# Patient Record
Sex: MALE | Employment: FULL TIME | URBAN - METROPOLITAN AREA
[De-identification: names, ages, dates, MRNs, and addresses within clinical notes are randomized per-mention and may not be internally consistent; named-entity substitution may affect disease eponyms.]

---

## 2019-02-08 ENCOUNTER — APPOINTMENT (OUTPATIENT)
Dept: GENERAL RADIOLOGY | Age: 49
End: 2019-02-08
Attending: EMERGENCY MEDICINE
Payer: COMMERCIAL

## 2019-02-08 ENCOUNTER — APPOINTMENT (OUTPATIENT)
Dept: CT IMAGING | Age: 49
End: 2019-02-08
Attending: EMERGENCY MEDICINE
Payer: COMMERCIAL

## 2019-02-08 ENCOUNTER — HOSPITAL ENCOUNTER (OUTPATIENT)
Age: 49
Setting detail: OBSERVATION
Discharge: HOME | End: 2019-02-10
Attending: EMERGENCY MEDICINE | Admitting: INTERNAL MEDICINE
Payer: COMMERCIAL

## 2019-02-08 DIAGNOSIS — H53.9 TRANSIENT VISION DISTURBANCE OF LEFT EYE: ICD-10-CM

## 2019-02-08 DIAGNOSIS — G43.109 COMPLICATED MIGRAINE: ICD-10-CM

## 2019-02-08 DIAGNOSIS — G45.9 TIA (TRANSIENT ISCHEMIC ATTACK): Primary | ICD-10-CM

## 2019-02-08 DIAGNOSIS — G45.3 AMAUROSIS FUGAX OF LEFT EYE: ICD-10-CM

## 2019-02-08 DIAGNOSIS — R51.9 NONINTRACTABLE HEADACHE, UNSPECIFIED CHRONICITY PATTERN, UNSPECIFIED HEADACHE TYPE: ICD-10-CM

## 2019-02-08 DIAGNOSIS — R47.9 SPEECH DISTURBANCE, UNSPECIFIED TYPE: ICD-10-CM

## 2019-02-08 LAB
ANION GAP SERPL CALC-SCNC: 7 MMOL/L (ref 5–15)
BASOPHILS # BLD: 0 K/UL (ref 0–0.1)
BASOPHILS NFR BLD: 0 % (ref 0–1)
BUN SERPL-MCNC: 12 MG/DL (ref 6–20)
BUN/CREAT SERPL: 11 (ref 12–20)
CALCIUM SERPL-MCNC: 9.2 MG/DL (ref 8.5–10.1)
CHLORIDE SERPL-SCNC: 104 MMOL/L (ref 97–108)
CO2 SERPL-SCNC: 30 MMOL/L (ref 21–32)
CREAT SERPL-MCNC: 1.06 MG/DL (ref 0.7–1.3)
DIFFERENTIAL METHOD BLD: NORMAL
EOSINOPHIL # BLD: 0.2 K/UL (ref 0–0.4)
EOSINOPHIL NFR BLD: 2 % (ref 0–7)
ERYTHROCYTE [DISTWIDTH] IN BLOOD BY AUTOMATED COUNT: 12.4 % (ref 11.5–14.5)
GLUCOSE BLD STRIP.AUTO-MCNC: 89 MG/DL (ref 65–100)
GLUCOSE SERPL-MCNC: 83 MG/DL (ref 65–100)
HCT VFR BLD AUTO: 46.2 % (ref 36.6–50.3)
HGB BLD-MCNC: 16.4 G/DL (ref 12.1–17)
IMM GRANULOCYTES # BLD AUTO: 0 K/UL (ref 0–0.04)
IMM GRANULOCYTES NFR BLD AUTO: 0 % (ref 0–0.5)
INR PPP: 1 (ref 0.9–1.1)
LYMPHOCYTES # BLD: 2.6 K/UL (ref 0.8–3.5)
LYMPHOCYTES NFR BLD: 31 % (ref 12–49)
MCH RBC QN AUTO: 32.7 PG (ref 26–34)
MCHC RBC AUTO-ENTMCNC: 35.5 G/DL (ref 30–36.5)
MCV RBC AUTO: 92.2 FL (ref 80–99)
MONOCYTES # BLD: 0.8 K/UL (ref 0–1)
MONOCYTES NFR BLD: 10 % (ref 5–13)
NEUTS SEG # BLD: 4.7 K/UL (ref 1.8–8)
NEUTS SEG NFR BLD: 56 % (ref 32–75)
NRBC # BLD: 0 K/UL (ref 0–0.01)
NRBC BLD-RTO: 0 PER 100 WBC
PLATELET # BLD AUTO: 226 K/UL (ref 150–400)
PMV BLD AUTO: 10.1 FL (ref 8.9–12.9)
POTASSIUM SERPL-SCNC: 3.5 MMOL/L (ref 3.5–5.1)
PROTHROMBIN TIME: 9.9 SEC (ref 9–11.1)
RBC # BLD AUTO: 5.01 M/UL (ref 4.1–5.7)
SERVICE CMNT-IMP: NORMAL
SODIUM SERPL-SCNC: 141 MMOL/L (ref 136–145)
WBC # BLD AUTO: 8.3 K/UL (ref 4.1–11.1)

## 2019-02-08 PROCEDURE — 71045 X-RAY EXAM CHEST 1 VIEW: CPT

## 2019-02-08 PROCEDURE — 74011250636 HC RX REV CODE- 250/636: Performed by: INTERNAL MEDICINE

## 2019-02-08 PROCEDURE — 93005 ELECTROCARDIOGRAM TRACING: CPT

## 2019-02-08 PROCEDURE — 70450 CT HEAD/BRAIN W/O DYE: CPT

## 2019-02-08 PROCEDURE — 99285 EMERGENCY DEPT VISIT HI MDM: CPT

## 2019-02-08 PROCEDURE — 85610 PROTHROMBIN TIME: CPT

## 2019-02-08 PROCEDURE — 80048 BASIC METABOLIC PNL TOTAL CA: CPT

## 2019-02-08 PROCEDURE — 74011250637 HC RX REV CODE- 250/637: Performed by: EMERGENCY MEDICINE

## 2019-02-08 PROCEDURE — 99218 HC RM OBSERVATION: CPT

## 2019-02-08 PROCEDURE — 82962 GLUCOSE BLOOD TEST: CPT

## 2019-02-08 PROCEDURE — 94762 N-INVAS EAR/PLS OXIMTRY CONT: CPT

## 2019-02-08 PROCEDURE — 85025 COMPLETE CBC W/AUTO DIFF WBC: CPT

## 2019-02-08 PROCEDURE — 36415 COLL VENOUS BLD VENIPUNCTURE: CPT

## 2019-02-08 RX ORDER — SODIUM CHLORIDE, SODIUM LACTATE, POTASSIUM CHLORIDE, CALCIUM CHLORIDE 600; 310; 30; 20 MG/100ML; MG/100ML; MG/100ML; MG/100ML
75 INJECTION, SOLUTION INTRAVENOUS CONTINUOUS
Status: DISCONTINUED | OUTPATIENT
Start: 2019-02-09 | End: 2019-02-09

## 2019-02-08 RX ORDER — ACETAMINOPHEN 325 MG/1
650 TABLET ORAL
Status: DISCONTINUED | OUTPATIENT
Start: 2019-02-08 | End: 2019-02-10 | Stop reason: HOSPADM

## 2019-02-08 RX ORDER — ASPIRIN 325 MG
325 TABLET ORAL
Status: COMPLETED | OUTPATIENT
Start: 2019-02-08 | End: 2019-02-08

## 2019-02-08 RX ADMIN — SODIUM CHLORIDE, SODIUM LACTATE, POTASSIUM CHLORIDE, AND CALCIUM CHLORIDE 75 ML/HR: 600; 310; 30; 20 INJECTION, SOLUTION INTRAVENOUS at 23:36

## 2019-02-08 RX ADMIN — ASPIRIN 325 MG: 325 TABLET ORAL at 22:42

## 2019-02-09 ENCOUNTER — APPOINTMENT (OUTPATIENT)
Dept: NON INVASIVE DIAGNOSTICS | Age: 49
End: 2019-02-09
Attending: FAMILY MEDICINE
Payer: COMMERCIAL

## 2019-02-09 ENCOUNTER — APPOINTMENT (OUTPATIENT)
Dept: VASCULAR SURGERY | Age: 49
End: 2019-02-09
Attending: FAMILY MEDICINE
Payer: COMMERCIAL

## 2019-02-09 ENCOUNTER — APPOINTMENT (OUTPATIENT)
Dept: MRI IMAGING | Age: 49
End: 2019-02-09
Attending: FAMILY MEDICINE
Payer: COMMERCIAL

## 2019-02-09 LAB
ATRIAL RATE: 65 BPM
CALCULATED P AXIS, ECG09: 54 DEGREES
CALCULATED R AXIS, ECG10: 50 DEGREES
CALCULATED T AXIS, ECG11: 48 DEGREES
DIAGNOSIS, 93000: NORMAL
ECHO AO ROOT DIAM: 3.32 CM
ECHO AV PEAK GRADIENT: 5.3 MMHG
ECHO AV PEAK VELOCITY: 114.91 CM/S
ECHO LA MAJOR AXIS: 3.48 CM
ECHO LA TO AORTIC ROOT RATIO: 1.05
ECHO LV E' LATERAL VELOCITY: 10.67 CM/S
ECHO LV E' SEPTAL VELOCITY: 9.01 CM/S
ECHO LV INTERNAL DIMENSION DIASTOLIC: 4.52 CM (ref 4.2–5.9)
ECHO LV INTERNAL DIMENSION SYSTOLIC: 3.3 CM
ECHO LV IVSD: 0.75 CM (ref 0.6–1)
ECHO LV MASS 2D: 111.2 G (ref 88–224)
ECHO LV MASS INDEX 2D: 56.5 G/M2 (ref 49–115)
ECHO LV POSTERIOR WALL DIASTOLIC: 0.7 CM (ref 0.6–1)
ECHO MV A VELOCITY: 60.25 CM/S
ECHO MV AREA PHT: 2.7 CM2
ECHO MV E DECELERATION TIME (DT): 285.2 MS
ECHO MV E VELOCITY: 0.64 CM/S
ECHO MV E/A RATIO: 0.01
ECHO MV E/E' LATERAL: 0.06
ECHO MV E/E' RATIO (AVERAGED): 0.07
ECHO MV E/E' SEPTAL: 0.07
ECHO MV PRESSURE HALF TIME (PHT): 82.7 MS
ECHO PV MAX VELOCITY: 72.7 CM/S
ECHO PV PEAK GRADIENT: 2.1 MMHG
ECHO RV INTERNAL DIMENSION: 3.86 CM
ECHO RV TAPSE: 1.88 CM (ref 1.5–2)
ECHO TV REGURGITANT MAX VELOCITY: 190.79 CM/S
ECHO TV REGURGITANT PEAK GRADIENT: 14.6 MMHG
P-R INTERVAL, ECG05: 112 MS
Q-T INTERVAL, ECG07: 386 MS
QRS DURATION, ECG06: 90 MS
QTC CALCULATION (BEZET), ECG08: 401 MS
VENTRICULAR RATE, ECG03: 65 BPM

## 2019-02-09 PROCEDURE — 96360 HYDRATION IV INFUSION INIT: CPT

## 2019-02-09 PROCEDURE — 93308 TTE F-UP OR LMTD: CPT

## 2019-02-09 PROCEDURE — 99218 HC RM OBSERVATION: CPT

## 2019-02-09 PROCEDURE — 74011250637 HC RX REV CODE- 250/637: Performed by: INTERNAL MEDICINE

## 2019-02-09 PROCEDURE — 94760 N-INVAS EAR/PLS OXIMETRY 1: CPT

## 2019-02-09 PROCEDURE — 70553 MRI BRAIN STEM W/O & W/DYE: CPT

## 2019-02-09 PROCEDURE — 97116 GAIT TRAINING THERAPY: CPT

## 2019-02-09 PROCEDURE — 74011250636 HC RX REV CODE- 250/636: Performed by: HOSPITALIST

## 2019-02-09 PROCEDURE — 93880 EXTRACRANIAL BILAT STUDY: CPT

## 2019-02-09 PROCEDURE — 93306 TTE W/DOPPLER COMPLETE: CPT

## 2019-02-09 PROCEDURE — 74011636320 HC RX REV CODE- 636/320: Performed by: RADIOLOGY

## 2019-02-09 PROCEDURE — 97162 PT EVAL MOD COMPLEX 30 MIN: CPT

## 2019-02-09 PROCEDURE — 74011000258 HC RX REV CODE- 258: Performed by: RADIOLOGY

## 2019-02-09 PROCEDURE — 74011250636 HC RX REV CODE- 250/636: Performed by: FAMILY MEDICINE

## 2019-02-09 PROCEDURE — 96361 HYDRATE IV INFUSION ADD-ON: CPT

## 2019-02-09 PROCEDURE — 74011250637 HC RX REV CODE- 250/637: Performed by: FAMILY MEDICINE

## 2019-02-09 PROCEDURE — A9575 INJ GADOTERATE MEGLUMI 0.1ML: HCPCS | Performed by: HOSPITALIST

## 2019-02-09 RX ORDER — SODIUM CHLORIDE 0.9 % (FLUSH) 0.9 %
10 SYRINGE (ML) INJECTION
Status: COMPLETED | OUTPATIENT
Start: 2019-02-09 | End: 2019-02-09

## 2019-02-09 RX ORDER — SODIUM CHLORIDE 0.9 % (FLUSH) 0.9 %
5-40 SYRINGE (ML) INJECTION EVERY 8 HOURS
Status: DISCONTINUED | OUTPATIENT
Start: 2019-02-09 | End: 2019-02-10 | Stop reason: HOSPADM

## 2019-02-09 RX ORDER — GUAIFENESIN 100 MG/5ML
81 LIQUID (ML) ORAL DAILY
Status: DISCONTINUED | OUTPATIENT
Start: 2019-02-09 | End: 2019-02-10 | Stop reason: HOSPADM

## 2019-02-09 RX ORDER — SODIUM CHLORIDE 9 MG/ML
100 INJECTION, SOLUTION INTRAVENOUS CONTINUOUS
Status: DISCONTINUED | OUTPATIENT
Start: 2019-02-09 | End: 2019-02-09

## 2019-02-09 RX ORDER — GADOTERATE MEGLUMINE 376.9 MG/ML
15 INJECTION INTRAVENOUS
Status: COMPLETED | OUTPATIENT
Start: 2019-02-09 | End: 2019-02-09

## 2019-02-09 RX ORDER — SODIUM CHLORIDE 0.9 % (FLUSH) 0.9 %
5-40 SYRINGE (ML) INJECTION AS NEEDED
Status: DISCONTINUED | OUTPATIENT
Start: 2019-02-09 | End: 2019-02-10 | Stop reason: HOSPADM

## 2019-02-09 RX ADMIN — IOPAMIDOL 100 ML: 755 INJECTION, SOLUTION INTRAVENOUS at 18:54

## 2019-02-09 RX ADMIN — Medication 10 ML: at 16:28

## 2019-02-09 RX ADMIN — SODIUM CHLORIDE 100 ML: 900 INJECTION, SOLUTION INTRAVENOUS at 18:54

## 2019-02-09 RX ADMIN — SODIUM CHLORIDE 100 ML/HR: 900 INJECTION, SOLUTION INTRAVENOUS at 03:29

## 2019-02-09 RX ADMIN — Medication 10 ML: at 15:00

## 2019-02-09 RX ADMIN — Medication 10 ML: at 18:54

## 2019-02-09 RX ADMIN — Medication 10 ML: at 21:13

## 2019-02-09 RX ADMIN — ASPIRIN 81 MG CHEWABLE TABLET 81 MG: 81 TABLET CHEWABLE at 08:53

## 2019-02-09 RX ADMIN — GADOTERATE MEGLUMINE 15 ML: 376.9 INJECTION INTRAVENOUS at 16:28

## 2019-02-09 RX ADMIN — Medication 10 ML: at 05:25

## 2019-02-09 RX ADMIN — ACETAMINOPHEN 650 MG: 325 TABLET ORAL at 19:14

## 2019-02-09 NOTE — PROGRESS NOTES
Bedside shift change report given to Tomas Knox (oncoming nurse) by Fany Kemp (offgoing nurse). Report included the following information SBAR, Kardex, Procedure Summary, MAR, Accordion, Recent Results and Cardiac Rhythm NSR.

## 2019-02-09 NOTE — PROGRESS NOTES
Problem: Pressure Injury - Risk of 
Goal: *Prevention of pressure injury Document Alfonso Scale and appropriate interventions in the flowsheet. Outcome: Progressing Towards Goal 
Pressure Injury Interventions: 
  
 
  
 
  
 
  
 
  
 
  
 
  
 
 
 
 
Problem: Falls - Risk of 
Goal: *Absence of Falls Document Ann Primes Fall Risk and appropriate interventions in the flowsheet. Outcome: Progressing Towards Goal 
Fall Risk Interventions:

## 2019-02-09 NOTE — PROGRESS NOTES
Mr. Jhonathan Kirk was seen along with his wife and father. An assessment was completed. He lives with his wife in a 2 story home with 6 step to enter. He had no medical equipment in his home. He has not been in an acute rehab nor skilled facility. He has not had any home health in the past.  He does have a primary care physician who he sees on a regular basis. There were no discharge needs identified at this time. Will continue to follow for discharge planning. Signed By: Ronny Jacinto LCSW February 9, 2019

## 2019-02-09 NOTE — CONSULTS
Consult dictated. 43-year-old male with no vascular risk factors admitted with left peripheral visual field deficit followed by mild left temporal headache and transient difficulty with speech. Now back to normal.  I suspect that this was likely a migraine but cannot completely exclude TIA. Recommend full workup including MRI brain, lipid panel, echocardiogram and CT of the head and neck to evaluate posterior circulation. If all negative, DC home. Reasonable to continue baby aspirin.    Marko Grande MD

## 2019-02-09 NOTE — PROGRESS NOTES
TRANSFER - IN REPORT: 
 
Verbal report received from [facility] on [place]  being received from [facility](unit) for routine progression of care Report consisted of patients Situation, Background, Assessment and  
Recommendations(SBAR). Information from the following report(s) SBAR, ED Summary, Procedure Summary, MAR, Accordion and Recent Results was reviewed with the receiving nurse. Opportunity for questions and clarification was provided. Assessment completed upon patients arrival to unit and care assumed.

## 2019-02-09 NOTE — ED NOTES
Patient pain level assessed. Patient states having a headache. Rates pain 3/10. Patient was offered Tylenol for pain. Patient refused at this time.

## 2019-02-09 NOTE — PROGRESS NOTES
physical Therapy EVALUATION/DISCHARGE Patient: Nathan Uribe (68 y.o. male) Date: 2/9/2019 Primary Diagnosis: TIA (transient ischemic attack) [G45.9] Precautions:     
ASSESSMENT : 
Based on the objective data described below, the patient presents with transient symptoms of loss of left eye vision, which preceded his hospitalization. During this incident, he also had brief loss of speech. Prior to this incident, he was fully independent without AD's and works as a mailman. Today his wife was also present for the evaluation. His wife notes that they have had several family emergencies and stressful events in the past few months, one of which was spending the night in the hospital with his father-in-law earlier this week. Today his vision is fully clear as is his speech. He denies headache, blurred vision, dizziness or light-headedness. He has fully UE and LE AROM and strength. He demonstrates a normal gait without LOB at any point. He has normal balance. He is clear from a PT standpoint. Further skilled acute physical therapy is not indicated at this time. PLAN : 
Discharge Recommendations: None Further Equipment Recommendations for Discharge: N/A  
 
SUBJECTIVE:  
Patient stated I feel fine today.  OBJECTIVE DATA SUMMARY:  
HISTORY:   
History reviewed. No pertinent past medical history. History reviewed. No pertinent surgical history. Prior Level of Function/Home Situation: Independent with all ADL's and works as a mailman. Personal factors and/or comorbidities impacting plan of care:  
 
Home Situation Home Environment: Private residence # Steps to Enter: 3 One/Two Story Residence: Two story Living Alone: No 
Support Systems: Family member(s), Spouse/Significant Other/Partner Patient Expects to be Discharged to[de-identified] Private residence Current DME Used/Available at Home: None EXAMINATION/PRESENTATION/DECISION MAKING:  
Critical Behavior: 
Neurologic State: Alert, Appropriate for age Orientation Level: Oriented X4 Cognition: Appropriate decision making, Appropriate for age attention/concentration, Appropriate safety awareness Safety/Judgement: Good awareness of safety precautions Hearing: Auditory Auditory Impairment: None Skin:   
Edema:  
Range Of Motion: 
AROM: Within functional limits PROM: Within functional limits Strength:   
Strength: Within functional limits Tone & Sensation:  
Tone: Normal 
Sensation: Intact Coordination: 
Coordination: Within functional limits Vision:  
  
Functional Mobility: 
Bed Mobility: 
Rolling: Independent Supine to Sit: Independent Sit to Supine: Independent Scooting: Independent Transfers: 
Sit to Stand: Independent Stand to Sit: Independent Balance:  
Sitting: Intact Standing: Intact Ambulation/Gait Training: 
  
Gait Description (WDL): Within defined limits Functional Measure: 
Timed up and go: 
 
Timed Get Up And Go Test: 7  
 
 
< than 10 seconds=Normal  Greater then 13.5 seconds (in elderly)=Increased fall risk Jerone Litten M. Predicting the probability for falls in community dwelling older adults using the Timed Up and Go Test. Phys Ther. 2000;80:896-903. Physical Therapy Evaluation Charge Determination History Examination Presentation Decision-Making LOW Complexity : Zero comorbidities / personal factors that will impact the outcome / POC LOW Complexity : 1-2 Standardized tests and measures addressing body structure, function, activity limitation and / or participation in recreation  LOW Complexity : Stable, uncomplicated  LOW Complexity : FOTO score of  Based on the above components, the patient evaluation is determined to be of the following complexity level: LOW Pain: 
Pain Scale 1: Numeric (0 - 10) Pain Intensity 1: 0 Pain Location 1: Head Activity Tolerance:  
Good Please refer to the flowsheet for vital signs taken during this treatment. After treatment:  
[]   Patient left in no apparent distress sitting up in chair 
[]   Patient left in no apparent distress in bed 
[]   Call bell left within reach 
[]   Nursing notified 
[]   Caregiver present 
[]   Bed alarm activated COMMUNICATION/EDUCATION:  
Communication/Collaboration: 
[x]   Fall prevention education was provided and the patient/caregiver indicated understanding. [x]   Patient/family have participated as able and agree with findings and recommendations. []   Patient is unable to participate in plan of care at this time. Findings and recommendations were discussed with: Registered Nurse Thank you for this referral. 
Micheal Recio, PT Time Calculation: 25 mins

## 2019-02-09 NOTE — PROGRESS NOTES
Hospitalist Progress Note Mari Nelson NP Answering service: [phone] OR [phone] from in house phone Cell: [phone] Date of Service:  2019 NAME:  Enrique Nguyen :  1970 MRN:  [id number] Admission Summary: Pt presented as direct admission/transfer from [facility] to [address] [place] [address] with chief complaints of vision loss and trouble finding words. According to 
collective reports, the patient had onset of vision loss in his left eye which was transient, lasting for approximately 20-30 minutes and spontaneously resolving (described as standing ou in the sun and then looking at someone). He subsequently went to an event where he had trouble finding his words and noted aphasia lasting for a \"short period of time,\" which  spontaneously resolved. According to ER report, patient had a left temporal headache, which again has resolved. Pmhx: lumbar diskectomy Interval history / Subjective:  
Pt in bed, wife at bedside. No new complaints or reoccurance of symptoms. Discussed plan of care, awaiting imaging. Assessment & Plan:  
 
Possible TIA or possible migraine: -  MRI of the brain: Essentially normal MRI of the head. - bilateral carotid Doppler: Prelim: Consistent with no stenosis of the right internal carotid and minimal to no stenosis of the left internal carotid. - Echo: Trace mitral valve regurgitation, no PFO 
- Neurologist has seen, possible migraine but cannot exclude TIA, needs to complete stroke work up 
- HgbA1c and lipid panel pending 
- started asa on admit Left eye vision loss: Resolved, as above Expressive aphasia: Resolved Headache: Resolved. Code status: Full DVT prophylaxis: SCDs Care Plan discussed with: patient/family Disposition: home soon, likely tomorrow Hospital Problems  Date Reviewed: 2019 Codes Class Noted POA * (Principal) TIA (transient ischemic attack) ICD-10-CM: G45.9 ICD-9-CM: 435.9  2/8/2019 Unknown Review of Systems:  
Denies HA. No aphasia. No chest, pressure or palpitations. No respiratory or GI complaints. Vital Signs:  
 Last 24hrs VS reviewed since prior progress note. Most recent are: 
Visit Vitals /66 (BP 1 Location: Left arm, BP Patient Position: At rest) Pulse 66 Temp 97.6 °F (36.4 °C) Resp 18 Ht 6' (1.829 m) Wt 75.3 kg (166 lb) SpO2 98% BMI 22.51 kg/m² No intake or output data in the 24 hours ending 02/09/19 1743 Physical Examination:  
     
Constitutional:  No acute distress, cooperative, pleasant   
ENT:  Oral MM moist, oropharynx benign. Resp:  No accessory muscle use, on RA. CV:  Regular rhythm, normal rate, no murmurs. NSR on tele Musculoskeletal:  No edema, warm, 2+ pulses throughout Neurologic:  Moves all extremities. AAOx3, CN II-XII reviewed. No noted motor deficits. Sensation is intact. Speech clear. Data Review:  
Review and/or order of clinical lab test 
Review and/or order of tests in the radiology section of CPT Review and/or order of tests in the medicine section of CPT Labs:  
 
Recent Labs 02/08/19 
2213 WBC 8.3 HGB 16.4 HCT 46.2  Recent Labs 02/08/19 
2213   
K 3.5  CO2 30 BUN 12  
CREA 1.06  
GLU 83  
CA 9.2 No results for input(s): SGOT, GPT, ALT, AP, TBIL, TBILI, TP, ALB, GLOB, GGT, AML, LPSE in the last 72 hours. No lab exists for component: AMYP, HLPSE Recent Labs 02/08/19 2213 INR 1.0 PTP 9.9 No results for input(s): FE, TIBC, PSAT, FERR in the last 72 hours. No results found for: FOL, RBCF No results for input(s): PH, PCO2, PO2 in the last 72 hours. No results for input(s): CPK, CKNDX, TROIQ in the last 72 hours. No lab exists for component: CPKMB No results found for: CHOL, CHOLX, CHLST, CHOLV, HDL, LDL, LDLC, DLDLP, Luci Faith, [address] [place] [address] Rd, [address] [facility], [address]  [facility] Lab Results Component Value Date/Time Glucose (POC) 89 02/08/2019 10:02 PM  
 
No results found for: COLOR, APPRN, SPGRU, REFSG, JARETH, PROTU, GLUCU, KETU, BILU, UROU, EUN, LEUKU, GLUKE, EPSU, BACTU, WBCU, RBCU, CASTS, UCRY Medications Reviewed:  
 
Current Facility-Administered Medications Medication Dose Route Frequency  influenza vaccine 2018-19 (6 mos+)(PF) (FLUARIX QUAD/FLULAVAL QUAD) injection 0.5 mL  0.5 mL IntraMUSCular PRIOR TO DISCHARGE  sodium chloride (NS) flush 5-40 mL  5-40 mL IntraVENous Q8H  
 sodium chloride (NS) flush 5-40 mL  5-40 mL IntraVENous PRN  
 aspirin chewable tablet 81 mg  81 mg Oral DAILY  sodium chloride 0.9 % bolus infusion 100 mL  100 mL IntraVENous RAD ONCE  
 iopamidol (ISOVUE-370) 76 % injection 100 mL  100 mL IntraVENous RAD ONCE  
 sodium chloride (NS) flush 10 mL  10 mL IntraVENous RAD ONCE  
 acetaminophen (TYLENOL) tablet 650 mg  650 mg Oral Q4H PRN  
______________________________________________________________________ EXPECTED LENGTH OF STAY: - - - 
ACTUAL LENGTH OF STAY:          0 Angelika Belcher NP

## 2019-02-09 NOTE — H&P
[address] HISTORY AND PHYSICAL Name:  Kemi Junior 
MR#:  [id number] :  1970 ACCOUNT #:  [de-identified] ADMIT DATE:  2019 CHIEF COMPLAINT:  Vision loss, trouble finding words, and headache. HISTORY OF PRESENT ILLNESS:  A 78-year-old white male with past medical history of 
prior  lumbar diskectomy who presented as direct admission/transfer from [facility] to [facility] with chief complaints of 
vision loss and trouble finding words. The patient provided some history. Major 
history was obtained from review of ED and electronic medical records. According to 
the collective report, the patient had onset of vision loss in his left eye which was 
transient, lasting for approximately 20-30 minutes and spontaneously resolving with 
onset at 4 o'clock p.m. on 2019. The patient subsequently went to an event 
where he had trouble finding his words with noted aphasia lasting for a \"short period 
of time,\" which again spontaneously resolved. According to ER report, the patient 
had a left temporal headache, which again has resolved. The patient has described 
his vision loss as \"like standing out in the [employer] and then looking at someone. \" He is 
not having any residual neurological symptoms at this time. He denies having any 
facial droop. No reports of slurred speech, dizziness, lightheadedness, syncope, 
loss of consciousness, ataxia, falls, head or neck trauma, earaches, weakness, 
numbness, chest pain, shortness of breath, cough, congestion, abdominal pain, nausea, 
vomiting, diarrhea, melena, dysuria, hematuria, calf pain, swelling, edema, fever, 
chills, rash, neck pain, or other constitutional symptoms. PAST MEDICAL HISTORY:  Back pain. PAST SURGICAL HISTORY: 
1.  Lumbar diskectomy. 2.  Tonsillectomy. ALLERGIES:  NO KNOWN DRUG ALLERGIES. CURRENT MEDICATIONS:  None. SOCIAL HISTORY:  Denies smoking, alcohol, or drugs. He is . Wife is at the 
bedside. FAMILY HISTORY:  Negative for stroke. REVIEW OF SYSTEMS:  Pertinent positives or as noted in the HPI. All other systems 
were reviewed and negative. PHYSICAL EXAMINATION: 
VITAL SIGNS:  Temperature of 98.1 degrees Fahrenheit, blood pressure 111/60, heart 
rate of 70, respirations 14, O2 saturations 97% on room air. Recorded weight 166 
pounds (75.6 kg). GENERAL:  The patient in no acute respiratory distress. PSYCH:   The patient is awake, alert, and oriented x3. Flat affect. NEUROLOGIC:  GCS of 15. Moves extremities x4. Sensation is grossly intact without 
slurred speech, facial droop, or pronator drift. HEENT:  Normocephalic, atraumatic. PERRLA. EOMs intact. Sclerae are anicteric. Conjunctivae are clear. Nares are patent. Oropharynx is clear. Tongue is midline 
and nonedematous. NECK:  Supple without lymphadenopathy, JVD, or carotid bruits. No thyromegaly. Nontender. No acute palpable soft tissue or bony deformity. LYMPH:  Negative for cervical or supraclavicular adenopathy. RESPIRATORY:  Lungs clear to auscultation bilaterally. CVS:  Heart regular rate and rhythm. Normal S1 and S2. 
GI:  No rebound, guarding, or rigidity. Abdomen soft, nontender, nondistended. Normoactive bowel sounds. No auscultated abdominal bruits. No palpable abdominal 
mass. BACK:  No CVA tenderness. No step-off deformity. MUSCULOSKELETAL:  No calf tenderness. No acute palpable bony deformity. VASCULAR:  2+ radial, 2 DP pulses. No cyanosis, clubbing, or edema. SKIN:  Warm and dry. LABORATORY DATA:  Labs are reviewed as follows:  Sodium 141, potassium 3.5, chloride 104, CO2 30, BUN 12, creatinine 1.06, glucose of 83, anion gap 7, calcium 9.2, GFR 
greater than 60. WBC 8.3, hemoglobin 16.4, hematocrit 46.2, platelets 271, 
neutrophils 56%. INR 1.0, PT 9.9. CT Code Neuro head without contrast result showed no acute process, unremarkable head CT with no acute infarct. No intracranial hemorrhage, mass effect, or midline shift. Chest x-ray portable, no acute cardiopulmonary process. A 12-lead EKG, normal sinus rhythm at 65 beats per minute. IMPRESSION AND PLAN: 
1. Transient ischemic attack. Admit the patient for observation to the 
Neuro/Telemetry floor. Place on neurovascular checks, fall precautions. We will 
order an MRI of the brain with and without contrast.  We will order bilateral carotid Doppler and a 2D echocardiogram in the a.m. Consult with Neurologist.  Check lipid 
panel. We will order aspirin therapy. 2.  Left eye vision loss. Currently resolved. Plan as noted above. 3.  Expressive aphasia. Currently resolved. Continue with plan of care. We will 
order dysphasia screen. 4.  Headache. Currently resolved. 5.  Venous thromboembolism prophylaxis. Sequential compression devices to bilateral 
lower extremities. Arianne Mchgee MD [id number] 
D:  02/09/2019 3:42 T:  02/10/2019 11:37 
JOB #:  [id number]

## 2019-02-09 NOTE — CONSULTS
[address]    Name:  Harshad Perez  MR#:  [id number]  :  1970  ACCOUNT #:  [de-identified]  DATE OF SERVICE:  2018    REQUESTING PHYSICIAN:  Dr. Carl Harvey.    REASON FOR EVALUATION:  Stroke-like symptoms. HISTORY:  The patient is a 78-year-old male with no significant past medical history  who was in his usual baseline state of health and was at work. 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 He suddenly noticed that he was having  visual obscurations in the left peripheral visual field. He could see things but  could not clearly make out what they were. There were patches of blurriness. He  describes this as if he were looking at the son, and then tried to look at something  else. This continued for about 30 minutes or so. He was able to drive himself back  home. He noticed a mild headache in the left temporal region, which persisted for  some time and cleared up. Later, they went to eat at a fast food restaurant and then  went to watch a play. While he was there, he had some difficulty expressing himself,  but it lasted only for a few sentences. He called his eye doctor who advised that he  seek medical attention. He was admitted for further workup. He is completely back  to his baseline. He may have had mild headache here and there, but denies any  history of migraines. No similar symptoms or any other neurological events in the  past.  Denies any changes in strength, sensation, or balance. No difficulty with  chewing or swallowing. No focal motor sensory deficits. PAST MEDICAL HISTORY  As mentioned above. PAST SURGICAL HISTORY:  Lumbar diskectomy and tonsillectomy. ALLERGIES:  NONE. CURRENT MEDICATIONS:  None. FAMILY HISTORY:  Noncontributory. REVIEW OF SYSTEMS:  As mentioned above. PHYSICAL EXAMINATION:GENERAL:  On examination, the patient is alert, fully oriented.    His speech is clear, comprehension is normal.  VITAL SIGNS:  Blood pressure 104/66, temperature is 98, pulse of 67. HEENT:  Pupils equal, round, and reactive. Fundus reveals clear disc margins. Visual fields are intact. Hearing is baseline. The patient's sensation is intact. Tongue is midline. HEART:  Regular rate and rhythm. CHEST:  Clear. ABDOMEN:  Soft, nontender, positive bowel sounds. EXTREMITIES:  No edema. NEUROLOGIC:  Muscle tone and bulk normal.  Strength normal in both upper and lower  extremities. Deep tendon reflexes 2/2 and symmetric. Toes downgoing. Sensation  intact. Gait normal.    LABORATORY DATA:  CBC and chemistries are unremarkable. CT brain was negative. Carotid duplex was negative. Echocardiogram is pending. ASSESSMENT AND PLAN:  A 49-year-old male with no significant vascular risk factors  who was admitted with symptoms of left peripheral visual field obscurations followed  by mild left temporal headache and transient difficulty with speech. He is now  completely back to normal.  I suspect that this was most likely a migraine but cannot  completely exclude transient ischemic attack. Recommend full workup including MRI of  brain, lipid panel, echocardiogram and CTA of the head and neck to evaluate posterior  circulation. If this is all negative, we can discharge him home. The patient is to  continue baby aspirin.         Tierra Mercer MD      [id number]  D:  02/09/2019 11:21  T:  02/09/2019 14:36  JOB #:  [id number]

## 2019-02-09 NOTE — ED PROVIDER NOTES
46 y.o. Male with no significant PMH, on no medications presents with an episode of vision loss in his left lateral upper and lower visual fields. This vision loss started at 4:00pm and lasted for about 30 minutes and then spontaneously resolved. Around 7:00pm while at an event with his family he had an episode of word finding difficulty which again lasted for a short period of time and then spontaneously resolved. He denies any other focal neuro deficits and now states that he feels normal. He notes a mild left temporal headache earlier similar to previous headaches. No recent head trauma. No hx of prior CVA or TIA. No other medical complaints. History reviewed. No pertinent past medical history. History reviewed. No pertinent surgical history. History reviewed. No pertinent family history. Social History Socioeconomic History  Marital status: Not on file Spouse name: Not on file  Number of children: Not on file  Years of education: Not on file  Highest education level: Not on file Social Needs  Financial resource strain: Not on file  Food insecurity - worry: Not on file  Food insecurity - inability: Not on file  Transportation needs - medical: Not on file  Transportation needs - non-medical: Not on file Occupational History  Not on file Tobacco Use  Smoking status: Not on file Substance and Sexual Activity  Alcohol use: Not on file  Drug use: Not on file  Sexual activity: Not on file Other Topics Concern  Not on file Social History Narrative  Not on file ALLERGIES: Patient has no allergy information on record. Review of Systems Constitutional: Positive for activity change. Negative for appetite change, chills and fever. HENT: Negative for congestion, rhinorrhea, sinus pain, sneezing and sore throat. Eyes: Positive for visual disturbance. Negative for photophobia. Respiratory: Negative for cough and shortness of breath. Cardiovascular: Negative for chest pain. Gastrointestinal: Negative for abdominal pain, blood in stool, constipation, diarrhea, nausea and vomiting. Genitourinary: Negative for difficulty urinating, dysuria, flank pain, hematuria, penile pain and testicular pain. Musculoskeletal: Negative for arthralgias, back pain, myalgias and neck pain. Skin: Negative for rash and wound. Neurological: Positive for speech difficulty and headaches. Negative for syncope, facial asymmetry, weakness, light-headedness and numbness. Psychiatric/Behavioral: Negative for self-injury and suicidal ideas. All other systems reviewed and are negative. Vitals:  
 02/08/19 2203 BP: 124/75 Pulse: 79 Resp: 16 Temp: 98.1 °F (36.7 °C) SpO2: 99% Weight: 77.1 kg (169 lb 15.6 oz) Physical Exam  
Constitutional: He is oriented to person, place, and time. He appears well-developed and well-nourished. No distress. HENT:  
Head: Normocephalic and atraumatic. Nose: Nose normal.  
Mouth/Throat: Oropharynx is clear and moist.  
Eyes: Conjunctivae and EOM are normal. Pupils are equal, round, and reactive to light. Neck: Normal range of motion. Neck supple. Cardiovascular: Normal rate, regular rhythm, normal heart sounds and intact distal pulses. Pulmonary/Chest: Effort normal and breath sounds normal.  
Abdominal: Soft. He exhibits no distension. There is no tenderness. Musculoskeletal: He exhibits no edema or tenderness. Neurological: He is alert and oriented to person, place, and time. He has normal strength. No cranial nerve deficit or sensory deficit. Coordination normal. GCS eye subscore is 4. GCS verbal subscore is 5. GCS motor subscore is 6. Intact sensation, 5/5 strength in all 4 extremities, intact finger to nose, neg pronator drift, fluent speech, CN intact. Intact visual fields Skin: Skin is warm and dry. He is not diaphoretic. Nursing note and vitals reviewed. MDM 
  46 y.o. male presents with concern for TIA. Ct head was done and was negative for any acute abnormalities. CXR neg Labs were drawn and returned showing no significant abnormalities. Procedures 2242 EKG shows NSR with a rate of 65bpm with no acute ST or T wave abnormalities suggestive of ischemia, no evidence of arrythmia. 10:11pm case discussed with Dr. Alona Corral. Concern for TIA. rec's giving 325mg ASA and admit for TIA workup. Hospitalist Abdulkadir for Admission 10:52 PM

## 2019-02-09 NOTE — ED TRIAGE NOTES
Today had some vision changes. Seeing dark spots like shadows. This is new starting at 1600. Then later today, he said he was having difficulty explaining what he needed this happened at 1900.

## 2019-02-09 NOTE — PROGRESS NOTES
Orders received and chart reviewed. Per nursing and PT, patient's symptoms have cleared. He has been up to bathroom ad rosalio and no current visual changes or speech changes, normal range UE strength with MMT. Upon entering patient's room, patient states he feels back to baseline, has been up to the bathroom and family at bedside say that have no current OT related concerns, deferring therapy. At this time, patient with no acute OT needs. Will sign off, please reconsult if new changes occur. Thank you, Gavino Storey, OTR/L

## 2019-02-10 VITALS
HEIGHT: 72 IN | RESPIRATION RATE: 15 BRPM | BODY MASS INDEX: 24.39 KG/M2 | DIASTOLIC BLOOD PRESSURE: 66 MMHG | WEIGHT: 180.1 LBS | TEMPERATURE: 97.6 F | SYSTOLIC BLOOD PRESSURE: 121 MMHG | HEART RATE: 71 BPM | OXYGEN SATURATION: 95 %

## 2019-02-10 PROBLEM — G45.9 TIA (TRANSIENT ISCHEMIC ATTACK): Status: RESOLVED | Noted: 2019-02-08 | Resolved: 2019-02-10

## 2019-02-10 LAB
CHOLEST SERPL-MCNC: 149 MG/DL
EST. AVERAGE GLUCOSE BLD GHB EST-MCNC: 100 MG/DL
HBA1C MFR BLD: 5.1 % (ref 4.2–6.3)
HDLC SERPL-MCNC: 35 MG/DL
HDLC SERPL: 4.3 {RATIO} (ref 0–5)
LDLC SERPL CALC-MCNC: 49.8 MG/DL (ref 0–100)
LEFT CCA DIST DIAS: 30 CM/S
LEFT CCA DIST SYS: 99.4 CM/S
LEFT CCA PROX DIAS: 28.4 CM/S
LEFT CCA PROX SYS: 118.9 CM/S
LEFT ECA DIAS: 18.73 CM/S
LEFT ECA SYS: 112.4 CM/S
LEFT ICA DIST DIAS: 21.5 CM/S
LEFT ICA DIST SYS: 48.5 CM/S
LEFT ICA MID DIAS: 20.6 CM/S
LEFT ICA MID SYS: 49.4 CM/S
LEFT ICA PROX DIAS: 21.5 CM/S
LEFT ICA PROX SYS: 68.1 CM/S
LEFT ICA/CCA SYS: 0.68
LEFT VERTEBRAL DIAS: 20.47 CM/S
LEFT VERTEBRAL SYS: 61.2 CM/S
LIPID PROFILE,FLP: ABNORMAL
RIGHT CCA DIST DIAS: 24.3 CM/S
RIGHT CCA DIST SYS: 89.5 CM/S
RIGHT CCA PROX DIAS: 25.6 CM/S
RIGHT CCA PROX SYS: 109.1 CM/S
RIGHT ECA DIAS: 13.83 CM/S
RIGHT ECA SYS: 105.1 CM/S
RIGHT ICA DIST DIAS: 25 CM/S
RIGHT ICA DIST SYS: 59 CM/S
RIGHT ICA MID DIAS: 21.5 CM/S
RIGHT ICA MID SYS: 49.4 CM/S
RIGHT ICA PROX DIAS: 17.7 CM/S
RIGHT ICA PROX SYS: 73.8 CM/S
RIGHT ICA/CCA SYS: 0.8
RIGHT VERTEBRAL DIAS: 12.74 CM/S
RIGHT VERTEBRAL SYS: 47.6 CM/S
TRIGL SERPL-MCNC: 321 MG/DL (ref ?–150)
VLDLC SERPL CALC-MCNC: 64.2 MG/DL

## 2019-02-10 PROCEDURE — 90471 IMMUNIZATION ADMIN: CPT

## 2019-02-10 PROCEDURE — 74011250636 HC RX REV CODE- 250/636: Performed by: INTERNAL MEDICINE

## 2019-02-10 PROCEDURE — 36415 COLL VENOUS BLD VENIPUNCTURE: CPT

## 2019-02-10 PROCEDURE — 83036 HEMOGLOBIN GLYCOSYLATED A1C: CPT

## 2019-02-10 PROCEDURE — 74011250637 HC RX REV CODE- 250/637: Performed by: FAMILY MEDICINE

## 2019-02-10 PROCEDURE — 99218 HC RM OBSERVATION: CPT

## 2019-02-10 PROCEDURE — 80061 LIPID PANEL: CPT

## 2019-02-10 PROCEDURE — 94760 N-INVAS EAR/PLS OXIMETRY 1: CPT

## 2019-02-10 PROCEDURE — 90686 IIV4 VACC NO PRSV 0.5 ML IM: CPT | Performed by: INTERNAL MEDICINE

## 2019-02-10 RX ORDER — GUAIFENESIN 100 MG/5ML
81 LIQUID (ML) ORAL DAILY
Qty: 30 TAB | Refills: 1 | Status: SHIPPED | OUTPATIENT
Start: 2019-02-10

## 2019-02-10 RX ADMIN — Medication 10 ML: at 05:46

## 2019-02-10 RX ADMIN — ASPIRIN 81 MG CHEWABLE TABLET 81 MG: 81 TABLET CHEWABLE at 08:58

## 2019-02-10 RX ADMIN — INFLUENZA VIRUS VACCINE 0.5 ML: 15; 15; 15; 15 SUSPENSION INTRAMUSCULAR at 09:40

## 2019-02-10 NOTE — ROUTINE PROCESS
Bedside shift change report given to Kamlesh Edge (oncoming nurse) by Darrell Rios (offgoing nurse). Report included the following information SBAR, Kardex, Med Rec Status and Cardiac Rhythm NSR.

## 2019-02-10 NOTE — DISCHARGE SUMMARY
Discharge Summary     PATIENT ID: Nathan Uribe  MRN: [id number]   YOB: 1970    DATE OF ADMISSION: 2/8/2019  9:58 PM    DATE OF DISCHARGE: 2/10/2019  PRIMARY CARE PROVIDER: Jose D Lorenzo MD   ATTENDING PHYSICIAN: Rachel Mehta MD  DISCHARGING PROVIDER: Jayshree Gabriel NP. To contact this individual call [phone] and ask the  to page. If unavailable ask to be transferred the Adult Hospitalist Department. CONSULTATIONS: IP CONSULT TO HOSPITALIST  IP CONSULT TO NEUROLOGY    ADMITTING DIAGNOSES & HOSPITAL COURSE:   Pt presented as direct admission/transfer from [facility] Department to [facility] with chief complaints of vision loss and trouble finding words.  According to  collective reports, the patient had onset of vision loss in his left eye which was transient, lasting for approximately 20-30 minutes and spontaneously resolving (described as standing ou in the sun and then looking at someone). [facility] subsequently went to an event where he had trouble finding his words and noted aphasia lasting for a \"short period of time,\" which  spontaneously resolved.  According to ER report, patient had a left temporal headache, which again has resolved.       Pmhx: lumbar diskectomy     DISCHARGE DIAGNOSES / PLAN:      Likely complex migraine:  - MRI of the brain: Essentially normal MRI of the head. - bilateral carotid Doppler: No stenosis of the R internal carotid and minimal to no stenosis of the L internal carotid. - Echo: Trace mitral valve regurgitation, no PFO  - CTA head and neck: no abnormality demonstrated in arch, neck and head arteries.  - HgbA1c 5.1   - lipid panel Tg 321 Chol 149 Hdl 35 Ldl 49.8.   - discussed elevated TG - recheck with PCP. Pt denies drinking alcohol but likes sweets.   - Discussed with neurology, pt may be discharged today with baby ASA    Left eye vision loss: Resolved, as above     Expressive aphasia: Resolved.     Headache: Resolved. FOLLOW UP APPOINTMENTS:    Follow-up Information     Follow up With Specialties Details Why Contact Info    Layne Sorensen MD Family Practice In 2 weeks post hospital check up [address] [place] [address]      Koko Uribe MD Neurology  As needed  [address] [facility]  [phone]           ADDITIONAL CARE RECOMMENDATIONS:   - start daily baby asa    - Triglycerides elevated - follow up with your PCP for recheck     DIET: Regular Diet    ACTIVITY: Activity as tolerated    DISCHARGE MEDICATIONS:  Current Discharge Medication List      START taking these medications    Details   aspirin 81 mg chewable tablet Take 1 Tab by mouth daily. Qty: 30 Tab, Refills: 1           NOTIFY YOUR PHYSICIAN FOR ANY OF THE FOLLOWING:   Fever over 101 degrees for 24 hours. Chest pain, shortness of breath, fever, chills, nausea, vomiting, diarrhea, change in mentation, falling, weakness, bleeding. Severe pain or pain not relieved by medications. Or, any other signs or symptoms that you may have questions about. DISPOSITION:    Home With:   OT  PT  HH  RN       Long term SNF/Inpatient Rehab    Independent/assisted living    Hospice   xx Other: Home     PATIENT CONDITION AT DISCHARGE:   Functional status    Poor     Deconditioned    xx Independent      Cognition   xx  Lucid     Forgetful     Dementia      Catheters/lines (plus indication)    [place]     PICC     PEG    xx None      Code status   xx  Full code     DNR      PHYSICAL EXAMINATION AT DISCHARGE:  /68 (BP 1 Location: Left arm, BP Patient Position: At rest)   Pulse 66   Temp 97.5 °F (36.4 °C)   Resp 14   Ht 6' (1.829 m)   Wt 81.7 kg (180 lb 1.6 oz)   SpO2 95%   BMI 24.43 kg/m²     Pt resting in bed, wife at bedside. Pt slept well overnight and no recurrence of presenting symptoms. Discussed discharge instructions and recommendations of a baby ASA daily.      Constitutional:  No acute distress, cooperative, pleasant    ENT:  Oral MM moist   Resp:  No accessory muscle use, on RA. CV:  Regular rhythm, normal rate, no murmurs. NSR on tele    Musculoskeletal:  No edema, warm, 2+ pulses throughout    Neurologic:  Moves all extremities. AAOx3, CN II-XII reviewed. No noted motor deficits. Speech clear.                 CHRONIC MEDICAL DIAGNOSES:  Problem List as of 2/10/2019 Date Reviewed: 2/10/2019          Codes Class Noted - Resolved    * (Principal) RESOLVED: TIA (transient ischemic attack) ICD-10-CM: G45.9  ICD-9-CM: 435.9  2/8/2019 - 2/10/2019            Greater than 30 minutes were spent with the patient on counseling and coordination of care    Signed:   Cody Sharif NP  2/10/2019  7:03 AM

## 2019-02-10 NOTE — PROGRESS NOTES
I have reviewed discharge instructions with the patient. The patient verbalized understanding. Discharge medications reviewed with patient and appropriate educational materials and side effects teaching were provided. PIV removed. Spouse providing transportation home. Pt wheeled downstairs by staff. Bedside RN performed patient education and medication education. Discharge concerns initiated and discussed with patient, including clarification on \"who\" assists the patient at their home and instructions for when the home going patient should call their provider after discharge. Opportunity for questions and clarification was provided. Patient receptive to education: YES Patient stated: Joycelyn Ayala is working on discharging me\" Barriers to Education: None Diagnosis Education given:  YES Length of stay: 0 Expected Day of Discharge: 2/10 Ask if they have \"Help at Home\" & add to white board? YES Education Day #: 1 Medication Education Given:  YES 
M in the box Medication name: Aspirin Pt aware of HCAHPS survey: YES Stroke Education documented in Patient Education: YES Core Measures Documented in [facility]: 
Risk Factors: YES Warning signs of stroke: YES When to Activate 911: YES Medication Education for Risk Factors: YES Smoking cessation if applicable: NO 
Written Education Given:  YES Discharge NIH Completed: YES Score: 0 BRAINS: YES Follow Up Appointment Made: NO 
Date/Time if applicable: NA

## 2019-02-10 NOTE — ROUTINE PROCESS
Bedside and Verbal shift change report given to Mike Estrada RN (oncoming nurse) by Alec Yoder RN (offgoing nurse). Report included the following information SBAR, Kardex, ED Summary, Procedure Summary, Intake/Output, MAR, Recent Results, Med Rec Status, Cardiac Rhythm NSR. , Alarm Parameters , Pre Procedure Checklist and Procedure Verification.

## 2022-06-12 ENCOUNTER — HOSPITAL ENCOUNTER (EMERGENCY)
Age: 52
Discharge: HOME | End: 2022-06-12
Attending: STUDENT IN AN ORGANIZED HEALTH CARE EDUCATION/TRAINING PROGRAM | Admitting: STUDENT IN AN ORGANIZED HEALTH CARE EDUCATION/TRAINING PROGRAM
Payer: COMMERCIAL

## 2022-06-12 VITALS
RESPIRATION RATE: 15 BRPM | SYSTOLIC BLOOD PRESSURE: 100 MMHG | DIASTOLIC BLOOD PRESSURE: 69 MMHG | OXYGEN SATURATION: 99 % | TEMPERATURE: 98.1 F | HEART RATE: 65 BPM | HEIGHT: 72 IN | WEIGHT: 165 LBS | BODY MASS INDEX: 22.35 KG/M2

## 2022-06-12 DIAGNOSIS — S61.412A LACERATION OF LEFT HAND WITHOUT FOREIGN BODY, INITIAL ENCOUNTER: Primary | ICD-10-CM

## 2022-06-12 PROCEDURE — 90471 IMMUNIZATION ADMIN: CPT

## 2022-06-12 PROCEDURE — 75810000293 HC SIMP/SUPERF WND  RPR

## 2022-06-12 PROCEDURE — 74011000250 HC RX REV CODE- 250: Performed by: NURSE PRACTITIONER

## 2022-06-12 PROCEDURE — 90715 TDAP VACCINE 7 YRS/> IM: CPT | Performed by: NURSE PRACTITIONER

## 2022-06-12 PROCEDURE — 99284 EMERGENCY DEPT VISIT MOD MDM: CPT

## 2022-06-12 PROCEDURE — 74011250636 HC RX REV CODE- 250/636: Performed by: NURSE PRACTITIONER

## 2022-06-12 RX ORDER — LIDOCAINE HYDROCHLORIDE 10 MG/ML
3 INJECTION INFILTRATION; PERINEURAL ONCE
Status: COMPLETED | OUTPATIENT
Start: 2022-06-12 | End: 2022-06-12

## 2022-06-12 RX ORDER — BACITRACIN 500 UNIT/G
PACKET (EA) TOPICAL
Status: COMPLETED | OUTPATIENT
Start: 2022-06-12 | End: 2022-06-12

## 2022-06-12 RX ADMIN — TETANUS TOXOID, REDUCED DIPHTHERIA TOXOID AND ACELLULAR PERTUSSIS VACCINE, ADSORBED 0.5 ML: 5; 2.5; 8; 8; 2.5 SUSPENSION INTRAMUSCULAR at 19:34

## 2022-06-12 RX ADMIN — LIDOCAINE HYDROCHLORIDE 3 ML: 10 INJECTION, SOLUTION INFILTRATION; PERINEURAL at 19:33

## 2022-06-12 RX ADMIN — Medication 2 PACKET: at 19:33

## 2022-06-12 NOTE — ED TRIAGE NOTES
Pt arrives ambulatory c/o left hand laceration. Pt was \"picking up a box with an electric knife inside and was poking through\". Pt denies any other issues.

## 2022-06-12 NOTE — ED PROVIDER NOTES
HPI     Jonathan Lobato is a 46 y.o. male without any PMhx who presents ambulatory to [facility] ED with cc of L hand laceration. Patient reports that he sustained laceration to the left palm after opening a box that had a  in it, today. Has full range of motion to all fingers without any difficulty. Is unsure whether or not his tetanus is up-to-date. Does not take any anticoagulation. Denies any other medical concerns at this time. States that he is otherwise been in his usual state of health. Denies F/C, N/V/D, cough, congestion, CP, SOB, dysuria, urinary frequency/hesitancy, flank pain. Denies tobacco/ ETOH/ substance abuse. PCP: Jax Phillips MD    There are no other complaints, changes or physical findings at this time. No past medical history on file. No past surgical history on file. No family history on file. Social History     Socioeconomic History    Marital status:      Spouse name: Not on file    Number of children: Not on file    Years of education: Not on file    Highest education level: Not on file   Occupational History    Not on file   Tobacco Use    Smoking status: Not on file    Smokeless tobacco: Not on file   Substance and Sexual Activity    Alcohol use: Not on file    Drug use: Not on file    Sexual activity: Not on file   Other Topics Concern    Not on file   Social History Narrative    Not on file     Social Determinants of Health     Financial Resource Strain:     Difficulty of Paying Living Expenses: Not on file   Food Insecurity:     Worried About Running Out of Food in the Last Year: Not on file    Randi of Food in the Last Year: Not on file   Transportation Needs:     Lack of Transportation (Medical): Not on file    Lack of Transportation (Non-Medical):  Not on file   Physical Activity:     Days of Exercise per Week: Not on file    Minutes of Exercise per Session: Not on file   Stress:     Feeling of Stress : Not on file Social Connections:     Frequency of Communication with Friends and Family: Not on file    Frequency of Social Gatherings with Friends and Family: Not on file    Attends [employer] Services: Not on file    Active Member of Clubs or Organizations: Not on file    Attends Club or Organization Meetings: Not on file    Marital Status: Not on file   Intimate Partner Violence:     Fear of Current or Ex-Partner: Not on file    Emotionally Abused: Not on file    Physically Abused: Not on file    Sexually Abused: Not on file   Housing Stability:     Unable to Pay for Housing in the Last Year: Not on file    Number of [place] in the Last Year: Not on file    Unstable Housing in the Last Year: Not on file         ALLERGIES: Patient has no known allergies. Review of Systems   Constitutional: Negative for activity change, appetite change, chills and fever. HENT: Negative for congestion, rhinorrhea and sore throat. Eyes: Negative for visual disturbance. Respiratory: Negative for cough and shortness of breath. Cardiovascular: Negative for chest pain. Gastrointestinal: Negative for abdominal pain, diarrhea, nausea and vomiting. Genitourinary: Negative for dysuria, flank pain, frequency and urgency. Musculoskeletal: Positive for arthralgias. Negative for back pain, gait problem, joint swelling, myalgias and neck pain. Skin: Positive for wound. Negative for color change and rash. Neurological: Negative for dizziness, weakness and headaches. Psychiatric/Behavioral: Negative for agitation, behavioral problems and confusion. All other systems reviewed and are negative. There were no vitals filed for this visit. Physical Exam  Vitals and nursing note reviewed. Constitutional:       General: He is not in acute distress. Appearance: He is well-developed. HENT:      Head: Normocephalic and atraumatic.       Right Ear: External ear normal.      Left Ear: External ear normal. Eyes:      Conjunctiva/sclera: Conjunctivae normal.      Pupils: Pupils are equal, round, and reactive to light. Cardiovascular:      Rate and Rhythm: Normal rate and regular rhythm. Heart sounds: Normal heart sounds. Pulmonary:      Effort: Pulmonary effort is normal.      Breath sounds: Normal breath sounds. Abdominal:      Tenderness: There is no abdominal tenderness. Musculoskeletal:         General: Signs of injury present. Normal range of motion. Cervical back: Normal range of motion and neck supple. Left lower leg: Left lower leg edema: 4 cm laceration to the L hand/ palm w/ controlled bleeding    Skin:     General: Skin is warm and dry. Neurological:      Mental Status: He is alert and oriented to person, place, and time. Psychiatric:         Behavior: Behavior normal.         Thought Content: Thought content normal.         Judgment: Judgment normal.          MDM  Number of Diagnoses or Management Options  Laceration of left hand without foreign body, initial encounter  Diagnosis management comments: DDx: hand laceration, injury, swelling     Hand laceration with full range of motion and no concern for ligament or bony injury based on physical exam presentation. Suture repair was done of wound with wound care education provided at discharge. Tdap was updated. Reasons to return to the ER were provided. Amount and/or Complexity of Data Reviewed  Review and summarize past medical records: yes           Procedures    Procedure Note - Laceration Repair:  7:38 PM  Procedure by Deena Antonio NP    Complexity: simple   4cm linear laceration to hand  was irrigated copiously with NS under jet lavage, prepped with Hibiclens and draped in a sterile fashion. The area was anesthetized via local infiltration of 3 mL lidocaine 1% without epinephrine. The wound was explored with the following results: No foreign bodies found.   The wound was repaired with One layer suture closure: Skin Layer:  4 sutures placed, stitch type:simple interrupted, suture: 4-0 nylon. .  The wound was closed with good hemostasis and approximation. dressing applied. Estimated blood loss: minimal  The procedure took 1-15 minutes, and pt tolerated well. LABORATORY TESTS:  No results found for this or any previous visit (from the past 12 hour(s)). IMAGING RESULTS:  No orders to display       MEDICATIONS GIVEN:  Medications   lidocaine (XYLOCAINE) 10 mg/mL (1 %) injection 3 mL (3 mL IntraDERMal Given 6/12/22 1933)   diph,Pertuss(AC),Tet Vac-PF (BOOSTRIX) suspension 0.5 mL (0.5 mL IntraMUSCular Given 6/12/22 1934)   bacitracin 500 unit/gram packet (2 Packets Topical Given 6/12/22 1933)       IMPRESSION:  1. Laceration of left hand without foreign body, initial encounter        PLAN:  1. Discharge Medication List as of 6/12/2022  8:20 PM        2. Follow-up Information     Follow up With Specialties Details Why Contact Info    [address] [facility] Emergency Medicine Go to  As needed, If symptoms worsen Aleksander Clark [phone] [facility]    Que Kiran MD Family Medicine  For suture removal in 7 days 2 [facility]  [address] [place] [address]          3.  Return to ED if worse

## 2022-06-13 NOTE — DISCHARGE INSTRUCTIONS
Go to your primary care provider or an urgent care in 7 to 10 days for suture removal.  Clean wound with light soap and water every day and cover with a thin layer of Neosporin, apply dressing over top (bandaid, gauze, etc.) . Do not submerge her hand under water. Return to the ER for any worsening or worrisome symptoms.

## 2022-08-19 ENCOUNTER — OFFICE VISIT (OUTPATIENT)
Dept: ORTHOPEDIC SURGERY | Age: 52
End: 2022-08-19
Payer: COMMERCIAL

## 2022-08-19 VITALS — BODY MASS INDEX: 21.94 KG/M2 | HEIGHT: 72 IN | WEIGHT: 162 LBS

## 2022-08-19 DIAGNOSIS — M25.519 SHOULDER PAIN, UNSPECIFIED CHRONICITY, UNSPECIFIED LATERALITY: Primary | ICD-10-CM

## 2022-08-19 DIAGNOSIS — M75.101 NONTRAUMATIC TEAR OF RIGHT ROTATOR CUFF, UNSPECIFIED TEAR EXTENT: ICD-10-CM

## 2022-08-19 DIAGNOSIS — M67.911 ROTATOR CUFF DISORDER, RIGHT: ICD-10-CM

## 2022-08-19 PROCEDURE — 99204 OFFICE O/P NEW MOD 45 MIN: CPT | Performed by: ORTHOPAEDIC SURGERY

## 2022-08-19 RX ORDER — OMEGA-3-ACID ETHYL ESTERS 1 G/1
CAPSULE, LIQUID FILLED ORAL
COMMUNITY
Start: 2022-08-18

## 2022-08-19 RX ORDER — FENOFIBRATE 160 MG/1
TABLET ORAL
COMMUNITY
Start: 2022-08-18

## 2022-08-30 DIAGNOSIS — M67.911 ROTATOR CUFF DISORDER, RIGHT: Primary | ICD-10-CM

## 2022-08-30 DIAGNOSIS — M75.101 NONTRAUMATIC TEAR OF RIGHT ROTATOR CUFF, UNSPECIFIED TEAR EXTENT: ICD-10-CM
